# Patient Record
Sex: FEMALE | Race: WHITE | Employment: FULL TIME | URBAN - METROPOLITAN AREA
[De-identification: names, ages, dates, MRNs, and addresses within clinical notes are randomized per-mention and may not be internally consistent; named-entity substitution may affect disease eponyms.]

---

## 2022-12-18 ENCOUNTER — APPOINTMENT (OUTPATIENT)
Dept: GENERAL RADIOLOGY | Age: 67
End: 2022-12-18
Payer: COMMERCIAL

## 2022-12-18 ENCOUNTER — HOSPITAL ENCOUNTER (EMERGENCY)
Age: 67
Discharge: HOME | End: 2022-12-18
Attending: STUDENT IN AN ORGANIZED HEALTH CARE EDUCATION/TRAINING PROGRAM
Payer: COMMERCIAL

## 2022-12-18 VITALS
WEIGHT: 146 LBS | DIASTOLIC BLOOD PRESSURE: 71 MMHG | BODY MASS INDEX: 24.92 KG/M2 | HEIGHT: 64 IN | HEART RATE: 75 BPM | RESPIRATION RATE: 19 BRPM | TEMPERATURE: 98 F | SYSTOLIC BLOOD PRESSURE: 144 MMHG | OXYGEN SATURATION: 97 %

## 2022-12-18 DIAGNOSIS — S51.011A ELBOW LACERATION, RIGHT, INITIAL ENCOUNTER: ICD-10-CM
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
## 2022-12-18 DIAGNOSIS — M25.521 RIGHT ELBOW PAIN: Primary | ICD-10-CM

## 2022-12-18 DIAGNOSIS — T07.XXXA MULTIPLE ABRASIONS: ICD-10-CM

## 2022-12-18 PROCEDURE — 99285 EMERGENCY DEPT VISIT HI MDM: CPT

## 2022-12-18 PROCEDURE — 2500000003 HC RX 250 WO HCPCS: Performed by: STUDENT IN AN ORGANIZED HEALTH CARE EDUCATION/TRAINING PROGRAM

## 2022-12-18 PROCEDURE — 12001 RPR S/N/AX/GEN/TRNK 2.5CM/<: CPT

## 2022-12-18 PROCEDURE — 6370000000 HC RX 637 (ALT 250 FOR IP): Performed by: STUDENT IN AN ORGANIZED HEALTH CARE EDUCATION/TRAINING PROGRAM

## 2022-12-18 PROCEDURE — 73080 X-RAY EXAM OF ELBOW: CPT

## 2022-12-18 RX ORDER — DOXYCYCLINE HYCLATE 100 MG/1
100 CAPSULE ORAL ONCE
Status: COMPLETED | OUTPATIENT
Start: 2022-12-18 | End: 2022-12-18

## 2022-12-18 RX ORDER — ACETAMINOPHEN 500 MG
1000 TABLET ORAL ONCE
Status: COMPLETED | OUTPATIENT
Start: 2022-12-18 | End: 2022-12-18

## 2022-12-18 RX ORDER — IBUPROFEN 400 MG/1
400 TABLET ORAL ONCE
Status: COMPLETED | OUTPATIENT
Start: 2022-12-18 | End: 2022-12-18

## 2022-12-18 RX ORDER — GINSENG 100 MG
CAPSULE ORAL ONCE
Status: COMPLETED | OUTPATIENT
Start: 2022-12-18 | End: 2022-12-18

## 2022-12-18 RX ORDER — IBUPROFEN 400 MG/1
400 TABLET ORAL EVERY 6 HOURS PRN
Qty: 120 TABLET | Refills: 0 | Status: SHIPPED | OUTPATIENT
Start: 2022-12-18

## 2022-12-18 RX ORDER — ACETAMINOPHEN 500 MG
1000 TABLET ORAL EVERY 6 HOURS PRN
Qty: 60 TABLET | Refills: 0 | Status: SHIPPED | OUTPATIENT
Start: 2022-12-18

## 2022-12-18 RX ORDER — DOXYCYCLINE HYCLATE 100 MG
100 TABLET ORAL 2 TIMES DAILY
Qty: 10 TABLET | Refills: 0 | Status: SHIPPED | OUTPATIENT
Start: 2022-12-18 | End: 2022-12-23

## 2022-12-18 RX ADMIN — LIDOCAINE HYDROCHLORIDE 10 ML: 10; .005 INJECTION, SOLUTION EPIDURAL; INFILTRATION; INTRACAUDAL; PERINEURAL at 21:01

## 2022-12-18 RX ADMIN — DOXYCYCLINE HYCLATE 100 MG: 100 CAPSULE ORAL at 21:52

## 2022-12-18 RX ADMIN — ACETAMINOPHEN 1000 MG: 500 TABLET ORAL at 20:57

## 2022-12-18 RX ADMIN — IBUPROFEN 400 MG: 400 TABLET, FILM COATED ORAL at 20:57

## 2022-12-18 RX ADMIN — BACITRACIN: 500 OINTMENT TOPICAL at 20:58

## 2022-12-18 ASSESSMENT — PAIN DESCRIPTION - PAIN TYPE: TYPE: ACUTE PAIN

## 2022-12-18 ASSESSMENT — ENCOUNTER SYMPTOMS
NAUSEA: 0
VOMITING: 0
EYE PAIN: 0
BACK PAIN: 0
SHORTNESS OF BREATH: 0
ABDOMINAL PAIN: 0
FACIAL SWELLING: 0

## 2022-12-18 ASSESSMENT — PAIN SCALES - GENERAL: PAINLEVEL_OUTOF10: 10

## 2022-12-18 ASSESSMENT — PAIN DESCRIPTION - ORIENTATION: ORIENTATION: RIGHT

## 2022-12-18 ASSESSMENT — PAIN - FUNCTIONAL ASSESSMENT: PAIN_FUNCTIONAL_ASSESSMENT: NONE - DENIES PAIN

## 2022-12-19 NOTE — ED NOTES
Taking over the care of pt. Pt a&ox4,s kin w/d/pink, pulses palp, 0 distress. Rt elbow round shape lac noted, unknown depth, 0 active bleeding. Dr. Chano Vo at bedside to stitch rt elbow lac.      Yari Bownes, RN  12/18/22 0992

## 2022-12-19 NOTE — ED NOTES
Patient refusing TDAP vaccine, stating she is allergy. Reaction: \"feels like my arm is going to fall off. \" Vaccine refused.  Dr. Candelaria Kathleen notifed     Isac Robles RN  12/18/22 2065

## 2022-12-19 NOTE — DISCHARGE INSTRUCTIONS
Make sure to follow up with have sutures removed in 7-10 days. Return to the ED if any new or worsening symptoms.

## 2022-12-19 NOTE — ED PROVIDER NOTES
[address] [facility] ED  eMERGENCY dEPARTMENT eNCOUnter      Pt Name: Rehan Hoyt  MRN: [id number]  [place] 1955  Date of evaluation: 12/18/2022  Provider: Felton Doran MD      HISTORY OF PRESENT ILLNESS      No chief complaint on file. The history is provided by the Patient. Rehan Hoyt is a 79 y.o. female with PMH clinically significant DMII presenting to the ED via PV c/o to the right shoulder, right elbow, right hand and right knee status post fall from standing occurring 1 to 2 hours prior to arrival.  States that she just accidentally fell while walking on the sidewalk. Denies any preceding lightheadedness, dizziness, chest pain, shortness of breath or palpitations. Denies hitting head or LOC. Is not on anticoagulation. Denies any associated headache, neck pain, back pain, chest pain, shortness of breath, abdominal pain, numbness, weakness, tingling. Complaining of wounds with minimal bleeding to the right elbow, right hand and right knee. Cleaned and dressed them at home. Was able to ambulate without difficulty after the event. Able to move all extremities without difficulty, but states more severe pain in the right elbow with movement. Characterizes pain as aching, constant, mild to moderate. Did not take anything for relief prior to arrival.  States they have otherwise been feeling well. Per Chart Review: No recent records currently available. Tetanus not UTD. REVIEW OF SYSTEMS       Review of Systems   Constitutional:  Negative for activity change and fever. HENT:  Negative for facial swelling and nosebleeds. Eyes:  Negative for pain and visual disturbance. Respiratory:  Negative for shortness of breath. Cardiovascular:  Negative for chest pain. Gastrointestinal:  Negative for abdominal pain, nausea and vomiting. Genitourinary:  Negative for hematuria. Musculoskeletal:  Positive for arthralgias and myalgias. Negative for back pain and neck pain.    Skin: Positive for wound. Neurological:  Negative for weakness, numbness and headaches. PAST MEDICAL HISTORY   History reviewed. No pertinent past medical history. SURGICAL HISTORY     History reviewed. No pertinent surgical history. FAMILY HISTORY     No family history on file. SOCIAL HISTORY       Social History     Socioeconomic History    Marital status:      Spouse name: None    Number of children: None    Years of education: None    Highest education level: None       CURRENT MEDICATIONS       Previous Medications    No medications on file       ALLERGIES     Aspirin, Tetanus toxoids, and Morphine and related      PHYSICAL EXAM       ED Triage Vitals   BP Temp Temp Source Heart Rate Resp SpO2 Height Weight   12/18/22 2031 12/18/22 2031 12/18/22 2031 12/18/22 2031 12/18/22 2031 12/18/22 2036 12/18/22 2031 12/18/22 2031   (!) 149/93 98 °F (36.7 °C) Temporal 82 18 99 % 5' 4\" (1.626 m) 146 lb (66.2 kg)       Physical Exam  Vitals and nursing note reviewed. Exam conducted with a chaperone present. Constitutional:       General: She is not in acute distress. Appearance: She is not ill-appearing, toxic-appearing or diaphoretic. HENT:      Head: Normocephalic and atraumatic. Right Ear: Tympanic membrane normal.      Left Ear: Tympanic membrane normal.      Nose:      Right Nostril: No septal hematoma. Left Nostril: No septal hematoma. Mouth/Throat:      Mouth: Mucous membranes are moist. No injury. Pharynx: Oropharynx is clear. Eyes:      Extraocular Movements: Extraocular movements intact. Pupils: Pupils are equal, round, and reactive to light. Neck:      Trachea: Trachea normal.   Cardiovascular:      Rate and Rhythm: Normal rate and regular rhythm. Pulses: Normal pulses. Heart sounds: Normal heart sounds. Pulmonary:      Effort: No respiratory distress. Breath sounds: Normal breath sounds.    Chest:      Chest wall: No deformity, tenderness or crepitus. Abdominal:      General: There is no distension. Palpations: Abdomen is soft. Tenderness: There is no abdominal tenderness. Musculoskeletal:         General: No deformity. Right shoulder: No swelling, deformity, effusion, tenderness, bony tenderness or crepitus. Normal range of motion. Right elbow: Swelling and laceration (subcentimeter Lac over the olecranon. No retained FB's or active bleeding) present. No deformity or effusion. Normal range of motion. Tenderness present in lateral epicondyle and olecranon process. Right forearm: Normal.      Right wrist: Normal.      Right hand: No swelling, deformity, tenderness or bony tenderness. Normal range of motion. Hands:       Cervical back: Normal range of motion and neck supple. No tenderness or bony tenderness. No spinous process tenderness. Thoracic back: No tenderness or bony tenderness. Lumbar back: No tenderness or bony tenderness. Right knee: Bony tenderness present. No swelling, deformity, effusion, lacerations or crepitus. Normal range of motion. Tenderness (Mild TTP surrounding abrasion.) present. Normal alignment. Right lower leg: No edema. Left lower leg: No edema. Legs:       Comments: No ligamentous instability. Skin:     General: Skin is warm and dry. Capillary Refill: Capillary refill takes less than 2 seconds. Neurological:      General: No focal deficit present. Mental Status: She is alert and oriented to person, place, and time. Psychiatric:         Mood and Affect: Mood is anxious. Behavior: Behavior normal. Behavior is cooperative.        MDM:   Chart Reviewed: PMH and additional information as noted in HPI obtained from chart review    Vitals:    Vitals:    12/18/22 2031 12/18/22 2036 12/18/22 2155   BP: (!) 149/93  (!) 144/71   Pulse: 82  75   Resp: 18  19   Temp: 98 °F (36.7 °C)     TempSrc: Temporal     SpO2:  99% 97%   Weight: 146 lb (66.2 kg) Height: 5' 4\" (1.626 m)         PROCEDURES:  Unless otherwise noted below, none  Lac Repair    Date/Time: 12/18/2022 9:52 PM  Performed by: Ayush Banegas MD  Authorized by: Ayush Banegas MD     Consent:     Consent obtained:  Verbal    Consent given by:  Patient    Risks, benefits, and alternatives were discussed: yes      Risks discussed:  Infection, pain, retained foreign body, poor cosmetic result, poor wound healing, need for additional repair and tendon damage    Alternatives discussed:  No treatment  Universal protocol:     Procedure explained and questions answered to patient or proxy's satisfaction: yes      Imaging studies available: yes      Required blood products, implants, devices, and special equipment available: yes      Immediately prior to procedure, a time out was called: yes      Patient identity confirmed:  Verbally with patient  Anesthesia:     Anesthesia method:  Local infiltration    Local anesthetic:  Lidocaine 1% WITH epi  Laceration details:     Location:  Shoulder/arm    Shoulder/arm location:  R elbow    Length (cm):  1    Depth (mm):  5  Pre-procedure details:     Preparation:  Patient was prepped and draped in usual sterile fashion and imaging obtained to evaluate for foreign bodies  Exploration:     Limited defect created (wound extended): no      Imaging obtained: x-ray      Imaging outcome: foreign body not noted      Wound exploration: wound explored through full range of motion and entire depth of wound visualized      Wound extent: no fascia violation noted and no foreign bodies/material noted      Contaminated: no    Treatment:     Area cleansed with:  Soap and water    Amount of cleaning:  Extensive    Irrigation method:  Syringe    Visualized foreign bodies/material removed: no      Debridement:  None    Undermining:  None    Scar revision: no    Skin repair:     Repair method:  Sutures    Suture size:  3-0    Suture material:  Prolene    Suture technique:  Simple interrupted    Number of sutures:  3  Approximation:     Approximation:  Close  Post-procedure details:     Dressing:  Antibiotic ointment and bulky dressing    Procedure completion:  Tolerated well, no immediate complications    LABS:  Labs Reviewed - No data to display    XR ELBOW RIGHT (MIN 3 VIEWS)   Final Result   No acute osseous abnormality. 79 y.o. female with PMH clinically significant DMII presenting to the ED via PV c/o to the right shoulder, right elbow, right hand and right knee status post fall from standing occurring 1 to 2 hours prior to arrival.  Upon initial evaluation, Pt anxious, but otherwise Afebrile, HDS and in NAD. PE as noted above. Imaging as noted above. Given findings, clinical presentation most likely consistent w/ multiple traumatic abrasions, arthralgias and traumatic laceration of the right elbow requiring repair in the ED. Although considered, no evidence of gross acute osseous abnormalities noted on right elbow x-ray. No other injury sites requiring imaging at this time. Low suspicion for significant traumatic bony injuries. Able to ambulate without difficulty in the ED. Low suspicion for traumatic ICH. Tolerating p.o. intake without difficulty. Otherwise largely well-appearing. Able to appreciate the bursa in the right elbow although wound fairly small. Extensively cleaned out at the bedside and not extensively contaminated. Multiple layers of clothing over skin during fall. No evidence of joint capsule involvement. 3 sutures administered for additional strength given site of wound. Will start ppx abx given hx of DM II. Stable for further evaluation and management as an outpatient. Patient understanding and amenable to the POC. Tetanus not up-to-date, the patient sitting bilateral arm numbness and feeling like they were falling off with last vaccine. Declined in the ED. All wounds cleaned and dressed in the ED.   Pt was administered   Medications Tetanus-Diphth-Acell Pertussis (BOOSTRIX) injection 0.5 mL (0.5 mLs IntraMUSCular Not Given 12/18/22 2103)   acetaminophen (TYLENOL) tablet 1,000 mg (1,000 mg Oral Given 12/18/22 2057)   ibuprofen (ADVIL;MOTRIN) tablet 400 mg (400 mg Oral Given 12/18/22 2057)   bacitracin ointment ( Topical Given 12/18/22 2058)   lidocaine-EPINEPHrine 1 percent-1:[id number] injection 10 mL (10 mLs IntraDERmal Given 12/18/22 2101)   doxycycline hyclate (VIBRAMYCIN) capsule 100 mg (100 mg Oral Given 12/18/22 2152)       Plan: Discharge home in good condition with meds as noted below and instructions to follow up with PCP . Pt stable and appropriate for further evaluation and management as an outpatient. Standard anticipatory guidance and strict return precautions given if any new or worsening symptoms. and Patient understanding and amenable to the POC. CRITICAL CARE TIME   Total CriticalCare time was 0 minutes, excluding separately reportable procedures. There was a high probability of clinically significant/life threatening deterioration in the patient's condition which required my urgent intervention. FINAL IMPRESSION      1. Right elbow pain    2. Elbow laceration, right, initial encounter    3. Fall, initial encounter    4.  Multiple abrasions          DISPOSITION/PLAN   DISPOSITION Decision To Discharge 12/18/2022 09:47:10 PM      Current Discharge Medication List        START taking these medications    Details   doxycycline hyclate (VIBRA-TABS) 100 MG tablet Take 1 tablet by mouth 2 times daily for 5 days  Qty: 10 tablet, Refills: 0      acetaminophen (TYLENOL) 500 MG tablet Take 2 tablets by mouth every 6 hours as needed for Pain or Fever  Qty: 60 tablet, Refills: 0      ibuprofen (IBU) 400 MG tablet Take 1 tablet by mouth every 6 hours as needed for Pain  Qty: 120 tablet, Refills: 0              MD Betty Guerin MD  12/18/22 2159

## 2022-12-19 NOTE — ED TRIAGE NOTES
Patient to ED for c/o fall from standing while walking on sidewalk at [address]. Patient states pain to Right shoulder, elbow, wrist, hand, knee and Left ankle  Patient denies LOC, did not hit head, and does not take blood thinners. Patient amb with steady gait into triage. Skin p/w/d. Respirations even and unlabored. No acute distress noted at this time.